# Patient Record
Sex: FEMALE | Race: WHITE | NOT HISPANIC OR LATINO
[De-identification: names, ages, dates, MRNs, and addresses within clinical notes are randomized per-mention and may not be internally consistent; named-entity substitution may affect disease eponyms.]

---

## 2022-01-10 PROBLEM — Z00.00 ENCOUNTER FOR PREVENTIVE HEALTH EXAMINATION: Status: ACTIVE | Noted: 2022-01-10

## 2022-01-11 ENCOUNTER — APPOINTMENT (OUTPATIENT)
Dept: SURGERY | Facility: CLINIC | Age: 62
End: 2022-01-11
Payer: COMMERCIAL

## 2022-01-11 VITALS
WEIGHT: 175 LBS | HEART RATE: 72 BPM | HEIGHT: 62 IN | OXYGEN SATURATION: 98 % | BODY MASS INDEX: 32.2 KG/M2 | DIASTOLIC BLOOD PRESSURE: 90 MMHG | RESPIRATION RATE: 18 BRPM | SYSTOLIC BLOOD PRESSURE: 162 MMHG

## 2022-01-11 PROCEDURE — 99202 OFFICE O/P NEW SF 15 MIN: CPT | Mod: 25

## 2022-01-11 PROCEDURE — 46600 DIAGNOSTIC ANOSCOPY SPX: CPT

## 2022-02-17 NOTE — ASSESSMENT
[FreeTextEntry1] : Ms. MICHAUD is a 61 year female who presents for symptomatic hemorrhoids. \par \par Discussed management options for hemorrhoids including medical management with fiber supplements, increased fluid intake, and symptom management during acute flares, office based procedures including rubber band ligation, and surgical excision. \par \par Given the findings today on exam recommend starting with medical management of hemorrhoids.\par Recommend daily fiber supplement such as Metamucil with 1 scoop in a glass of water in the morning. Daily fiber intake recommendation is 30 gm. \par Recommend 6-8 glasses of noncaffeinated beverage daily to help with constipation symptoms. \par \par If symptoms do not improve or return recommend follow up in 6-8 weeks.  Would consider rubber band ligation at that time.\par

## 2022-02-17 NOTE — PHYSICAL EXAM
[Abdomen Masses] : No abdominal masses [Abdomen Tenderness] : ~T No ~M abdominal tenderness [Excoriation] : no perianal excoriation [Fistula] : no fistulas [Nonprolapsing] : a nonprolapsing (grade I) [Normal] : was normal [None] : there was no rectal abscess [JVD] : no jugular venous distention  [Respiratory Effort] : normal respiratory effort [Normal Rate and Rhythm] : normal rate and rhythm [No Rash or Lesion] : No rash or lesion [Alert] : alert [Calm] : calm [de-identified] : Small external skin tags [de-identified] : No acute distress

## 2022-02-17 NOTE — HISTORY OF PRESENT ILLNESS
[FreeTextEntry1] : 61-year-old female here for initial evaluation for hemorrhoids.  Patient has had some bleeding with bowel movements.  This started a few weeks ago but seems to have resolved now.  She is not sure that she has any lumps on the outside.  She is not had any procedures for hemorrhoids in the past.  She denies prior surgical history.  No significant medical history no family history of colon cancer.  She had a colonoscopy in 2015.

## 2022-02-17 NOTE — PROCEDURE
[FreeTextEntry1] : Anoscopy: Anoscopic exam performed with lighted anoscope.  The mucosa appears normal.  Mild internal hemorrhoids.  No active bleeding.  No masses identified.

## 2022-03-22 ENCOUNTER — APPOINTMENT (OUTPATIENT)
Dept: SURGERY | Facility: CLINIC | Age: 62
End: 2022-03-22
Payer: COMMERCIAL

## 2022-03-22 VITALS
RESPIRATION RATE: 18 BRPM | BODY MASS INDEX: 32.2 KG/M2 | DIASTOLIC BLOOD PRESSURE: 97 MMHG | HEIGHT: 62 IN | WEIGHT: 175 LBS | HEART RATE: 71 BPM | OXYGEN SATURATION: 97 % | SYSTOLIC BLOOD PRESSURE: 158 MMHG

## 2022-03-22 PROCEDURE — 99212 OFFICE O/P EST SF 10 MIN: CPT

## 2022-03-22 NOTE — PHYSICAL EXAM
[Abdomen Masses] : No abdominal masses [Abdomen Tenderness] : ~T No ~M abdominal tenderness [Excoriation] : no perianal excoriation [Fistula] : no fistulas [Nonprolapsing] : a nonprolapsing (grade I) [Normal] : was normal [None] : there was no rectal abscess [JVD] : no jugular venous distention  [Respiratory Effort] : normal respiratory effort [Normal Rate and Rhythm] : normal rate and rhythm [No Rash or Lesion] : No rash or lesion [Alert] : alert [Calm] : calm [de-identified] : Small external skin tags resolving thrombosed external hemorrhoid. [de-identified] : No acute distress

## 2022-03-22 NOTE — ASSESSMENT
[FreeTextEntry1] : 7-year-old female here for follow-up for hemorrhoids.  Patient now has a resolving thrombosed external hemorrhoid.  Discussed management of thrombosed hemorrhoids including sitz bath's Tylenol Motrin.\par \par Patient is not currently interested in surgery.  Discussed ongoing bleeding can be treated with rubber band ligation.  The current bleeding she is having is from the draining thrombosed external hemorrhoid which should resolve.\par \par Continue medical management of hemorrhoids.  Follow-up as needed.

## 2022-03-22 NOTE — HISTORY OF PRESENT ILLNESS
[FreeTextEntry1] : 61-year-old female here for initial evaluation for hemorrhoids.  Patient has had some bleeding with bowel movements.  This started a few weeks ago but seems to have resolved now.  She is not sure that she has any lumps on the outside.  She is not had any procedures for hemorrhoids in the past.  She denies prior surgical history.  No significant medical history no family history of colon cancer.  She had a colonoscopy in 2015.\par \par \par Patient now returns for follow-up due to recent painful bulge on the external portion of her anal opening.  Patient notes that is been improving over the last week and she had some bloody drainage.  Overall symptoms improved significantly since yesterday.

## 2023-03-15 ENCOUNTER — APPOINTMENT (OUTPATIENT)
Dept: SURGERY | Facility: CLINIC | Age: 63
End: 2023-03-15
Payer: COMMERCIAL

## 2023-03-16 ENCOUNTER — APPOINTMENT (OUTPATIENT)
Dept: SURGERY | Facility: CLINIC | Age: 63
End: 2023-03-16
Payer: COMMERCIAL

## 2023-03-16 VITALS
WEIGHT: 192 LBS | DIASTOLIC BLOOD PRESSURE: 98 MMHG | BODY MASS INDEX: 35.33 KG/M2 | OXYGEN SATURATION: 97 % | TEMPERATURE: 98.2 F | HEIGHT: 62 IN | RESPIRATION RATE: 18 BRPM | HEART RATE: 81 BPM | SYSTOLIC BLOOD PRESSURE: 166 MMHG

## 2023-03-16 PROCEDURE — 99213 OFFICE O/P EST LOW 20 MIN: CPT

## 2023-03-17 NOTE — HISTORY OF PRESENT ILLNESS
[FreeTextEntry1] : 61-year-old female here for initial evaluation for hemorrhoids.  Patient has had some bleeding with bowel movements.  This started a few weeks ago but seems to have resolved now.  She is not sure that she has any lumps on the outside.  She is not had any procedures for hemorrhoids in the past.  She denies prior surgical history.  No significant medical history no family history of colon cancer.  She had a colonoscopy in 2015.\par \par \par Patient now returns once again for follow-up.  She was last seen in March 2022.  Recently had a recurrence of her symptoms with discomfort and bleeding.  Feels that the external hemorrhoidal tags have gotten bigger over the past year.  She is concerned that he will continue to worsen.  She is considering a procedure to improve her symptoms but she is unsure

## 2023-03-17 NOTE — PHYSICAL EXAM
[Abdomen Masses] : No abdominal masses [Abdomen Tenderness] : ~T No ~M abdominal tenderness [None] : no anal fissures seen [JVD] : no jugular venous distention  [Respiratory Effort] : normal respiratory effort [No Rash or Lesion] : No rash or lesion [Alert] : alert [Calm] : calm [de-identified] : External skin tags increased in size.  Resolving thrombosed prolapsed internal hemorrhoid.  No erythema no drainage [de-identified] : No acute distress

## 2023-03-17 NOTE — ASSESSMENT
[FreeTextEntry1] : Ms. MICHAUD is a 62 year female who presents for symptomatic hemorrhoids.  Currently has a resolving thrombosed hemorrhoid.\par \par Discussed management options for hemorrhoids including medical management with fiber supplements, increased fluid intake, and symptom management during acute flares, office based procedures including rubber band ligation, transanal hemorrhoidal dearterialization, and surgical excision. \par \par Given the findings today on exam recommend starting with medical management of hemorrhoids.\par Recommend daily fiber supplement such as Metamucil with 1 scoop in a glass of water in the morning. Daily fiber intake recommendation is 30 gm. \par Recommend 6-8 glasses of noncaffeinated beverage daily to help with constipation symptoms.\par \par Given the patient's ongoing symptoms I have recommended that she consider THD or hemorrhoidectomy but she is concerned about the postoperative discomfort or pain.  She will consider her options and return to discuss once again if she remains symptomatic.\par \par If symptoms do not improve or return recommend follow up in 6-8 weeks.\par

## 2023-04-06 ENCOUNTER — APPOINTMENT (OUTPATIENT)
Dept: SURGERY | Facility: CLINIC | Age: 63
End: 2023-04-06

## 2023-06-27 ENCOUNTER — APPOINTMENT (OUTPATIENT)
Dept: SURGERY | Facility: CLINIC | Age: 63
End: 2023-06-27
Payer: COMMERCIAL

## 2023-06-27 ENCOUNTER — NON-APPOINTMENT (OUTPATIENT)
Age: 63
End: 2023-06-27

## 2023-06-27 VITALS
DIASTOLIC BLOOD PRESSURE: 103 MMHG | BODY MASS INDEX: 34.96 KG/M2 | WEIGHT: 190 LBS | OXYGEN SATURATION: 94 % | HEIGHT: 62 IN | RESPIRATION RATE: 18 BRPM | SYSTOLIC BLOOD PRESSURE: 159 MMHG | HEART RATE: 95 BPM

## 2023-06-27 DIAGNOSIS — K64.9 UNSPECIFIED HEMORRHOIDS: ICD-10-CM

## 2023-06-27 PROCEDURE — 99212 OFFICE O/P EST SF 10 MIN: CPT

## 2023-07-21 PROBLEM — K64.9 HEMORRHOIDS: Status: ACTIVE | Noted: 2022-02-17

## 2023-07-21 NOTE — ASSESSMENT
[FreeTextEntry1] : Ms. MICHAUD is a 62 year female who presents for symptomatic hemorrhoids. \par \par Discussed management options for hemorrhoids including medical management with fiber supplements, increased fluid intake, and symptom management during acute flares, office based procedures including rubber band ligation, transanal hemorrhoidal dearterialization and surgical excision. \par \par Given the findings today on exam recommend surgical management with excisional hemorrhoidectomy.\par Discussed details of surgery including post-operative care and recovery. Should expect 10-14 days of pain that will improve each day. Can take 4-6 weeks to heal completely from the incisions. Small amounts of bleeding are normal and should resolve relatively soon after surgery. Sutures may tear out in the post-operative period but wounds will still heal with time. \par \par Risks and benefits discussed including post-operative pain, delayed wound healing, infection. Risk of anal stenosis discussed and potential that I will not be able to remove all of the hemorrhoidal tissue in one surgery.\par \par Patient will consider options and when she would like to proceed with surgery.  We will call the office to schedule\par \par

## 2023-07-21 NOTE — HISTORY OF PRESENT ILLNESS
[FreeTextEntry1] : 62-year-old female here for initial evaluation for hemorrhoids.  Patient has had some bleeding with bowel movements.  This started a few weeks ago but seems to have resolved now.  She is not sure that she has any lumps on the outside.  She is not had any procedures for hemorrhoids in the past.  She denies prior surgical history.  No significant medical history no family history of colon cancer.  She had a colonoscopy in 2015.\par \par \par Patient now returns once again for follow-up.  She was last seen in March 2023  Recently had a recurrence of her symptoms with discomfort and bleeding.  Feels that the external hemorrhoidal tags have gotten bigger over the past year.  She is concerned that he will continue to worsen.  She is considering a procedure to improve her symptoms but she is unsure.\par \par Returns again for follow-up to discuss possible surgical options.  She believes she is closer to deciding on surgery although she is still nervous about the pain associate with surgery.  Would like to review surgical options once again.

## 2023-07-21 NOTE — PHYSICAL EXAM
[Abdomen Masses] : No abdominal masses [Abdomen Tenderness] : ~T No ~M abdominal tenderness [None] : no anal fissures seen [JVD] : no jugular venous distention  [Respiratory Effort] : normal respiratory effort [No Rash or Lesion] : No rash or lesion [Alert] : alert [Calm] : calm [de-identified] : External skin tags increased in size..  No erythema no drainage [de-identified] : No acute distress

## 2024-08-26 ENCOUNTER — NON-APPOINTMENT (OUTPATIENT)
Age: 64
End: 2024-08-26

## 2025-07-31 ENCOUNTER — RESULT REVIEW (OUTPATIENT)
Age: 65
End: 2025-07-31

## 2025-08-01 ENCOUNTER — RESULT REVIEW (OUTPATIENT)
Age: 65
End: 2025-08-01

## 2025-08-06 ENCOUNTER — RESULT REVIEW (OUTPATIENT)
Age: 65
End: 2025-08-06

## 2025-08-08 ENCOUNTER — TRANSCRIPTION ENCOUNTER (OUTPATIENT)
Age: 65
End: 2025-08-08

## 2025-08-11 ENCOUNTER — RESULT REVIEW (OUTPATIENT)
Age: 65
End: 2025-08-11

## 2025-08-12 ENCOUNTER — TRANSCRIPTION ENCOUNTER (OUTPATIENT)
Age: 65
End: 2025-08-12

## 2025-08-19 ENCOUNTER — APPOINTMENT (OUTPATIENT)
Dept: SURGERY | Facility: CLINIC | Age: 65
End: 2025-08-19
Payer: COMMERCIAL

## 2025-08-19 ENCOUNTER — APPOINTMENT (OUTPATIENT)
Dept: THORACIC SURGERY | Facility: CLINIC | Age: 65
End: 2025-08-19
Payer: COMMERCIAL

## 2025-08-19 VITALS
BODY MASS INDEX: 31.1 KG/M2 | WEIGHT: 169 LBS | HEIGHT: 62 IN | RESPIRATION RATE: 20 BRPM | DIASTOLIC BLOOD PRESSURE: 91 MMHG | OXYGEN SATURATION: 96 % | HEART RATE: 95 BPM | SYSTOLIC BLOOD PRESSURE: 133 MMHG

## 2025-08-19 VITALS
DIASTOLIC BLOOD PRESSURE: 106 MMHG | BODY MASS INDEX: 31.1 KG/M2 | OXYGEN SATURATION: 96 % | HEART RATE: 128 BPM | SYSTOLIC BLOOD PRESSURE: 146 MMHG | HEIGHT: 62 IN | TEMPERATURE: 97.8 F | WEIGHT: 169 LBS

## 2025-08-19 DIAGNOSIS — Z93.2 ILEOSTOMY STATUS: ICD-10-CM

## 2025-08-19 DIAGNOSIS — Z83.79 FAMILY HISTORY OF OTHER DISEASES OF THE DIGESTIVE SYSTEM: ICD-10-CM

## 2025-08-19 DIAGNOSIS — Z98.890 OTHER SPECIFIED POSTPROCEDURAL STATES: ICD-10-CM

## 2025-08-19 DIAGNOSIS — Z78.9 OTHER SPECIFIED HEALTH STATUS: ICD-10-CM

## 2025-08-19 PROCEDURE — 99205 OFFICE O/P NEW HI 60 MIN: CPT

## 2025-08-19 PROCEDURE — 99024 POSTOP FOLLOW-UP VISIT: CPT

## 2025-08-19 RX ORDER — METRONIDAZOLE 500 MG/1
500 TABLET ORAL
Refills: 0 | Status: ACTIVE | COMMUNITY

## 2025-08-19 RX ORDER — LEVOFLOXACIN 500 MG/1
500 TABLET, FILM COATED ORAL
Refills: 0 | Status: ACTIVE | COMMUNITY

## 2025-08-20 PROBLEM — Z83.79 FAMILY HISTORY OF COLONIC DIVERTICULITIS: Status: ACTIVE | Noted: 2025-08-19

## 2025-08-21 DIAGNOSIS — D49.89 NEOPLASM OF UNSPECIFIED BEHAVIOR OF OTHER SPECIFIED SITES: ICD-10-CM

## 2025-09-03 ENCOUNTER — NON-APPOINTMENT (OUTPATIENT)
Age: 65
End: 2025-09-03

## 2025-09-03 DIAGNOSIS — B36.9 SUPERFICIAL MYCOSIS, UNSPECIFIED: ICD-10-CM

## 2025-09-03 RX ORDER — NYSTATIN 100000 [USP'U]/G
100000 POWDER TOPICAL
Qty: 1 | Refills: 0 | Status: ACTIVE | COMMUNITY
Start: 2025-09-03 | End: 1900-01-01

## 2025-09-08 ENCOUNTER — NON-APPOINTMENT (OUTPATIENT)
Age: 65
End: 2025-09-08

## 2025-09-10 ENCOUNTER — RESULT REVIEW (OUTPATIENT)
Age: 65
End: 2025-09-10

## 2025-09-10 ENCOUNTER — APPOINTMENT (OUTPATIENT)
Dept: SURGERY | Facility: CLINIC | Age: 65
End: 2025-09-10
Payer: COMMERCIAL

## 2025-09-10 VITALS
BODY MASS INDEX: 31.14 KG/M2 | HEIGHT: 62 IN | SYSTOLIC BLOOD PRESSURE: 149 MMHG | OXYGEN SATURATION: 98 % | DIASTOLIC BLOOD PRESSURE: 85 MMHG | HEART RATE: 109 BPM | WEIGHT: 169.2 LBS

## 2025-09-10 DIAGNOSIS — Z93.2 ILEOSTOMY STATUS: ICD-10-CM

## 2025-09-10 DIAGNOSIS — Z78.9 OTHER SPECIFIED HEALTH STATUS: ICD-10-CM

## 2025-09-10 PROCEDURE — 99024 POSTOP FOLLOW-UP VISIT: CPT

## 2025-09-15 ENCOUNTER — APPOINTMENT (OUTPATIENT)
Dept: SURGERY | Facility: HOSPITAL | Age: 65
End: 2025-09-15

## 2025-09-15 ENCOUNTER — RESULT REVIEW (OUTPATIENT)
Age: 65
End: 2025-09-15

## 2025-09-15 ENCOUNTER — TRANSCRIPTION ENCOUNTER (OUTPATIENT)
Age: 65
End: 2025-09-15

## 2025-09-17 ENCOUNTER — TRANSCRIPTION ENCOUNTER (OUTPATIENT)
Age: 65
End: 2025-09-17